# Patient Record
Sex: FEMALE | Race: WHITE | HISPANIC OR LATINO
[De-identification: names, ages, dates, MRNs, and addresses within clinical notes are randomized per-mention and may not be internally consistent; named-entity substitution may affect disease eponyms.]

---

## 2021-06-22 PROBLEM — Z00.00 ENCOUNTER FOR PREVENTIVE HEALTH EXAMINATION: Status: ACTIVE | Noted: 2021-06-22

## 2021-06-29 ENCOUNTER — APPOINTMENT (OUTPATIENT)
Dept: VASCULAR SURGERY | Facility: CLINIC | Age: 46
End: 2021-06-29
Payer: MEDICAID

## 2021-06-29 VITALS
BODY MASS INDEX: 31.82 KG/M2 | HEIGHT: 66 IN | WEIGHT: 198 LBS | DIASTOLIC BLOOD PRESSURE: 93 MMHG | SYSTOLIC BLOOD PRESSURE: 155 MMHG | HEART RATE: 93 BPM

## 2021-06-29 DIAGNOSIS — I87.2 VENOUS INSUFFICIENCY (CHRONIC) (PERIPHERAL): ICD-10-CM

## 2021-06-29 PROCEDURE — 99203 OFFICE O/P NEW LOW 30 MIN: CPT

## 2021-06-29 PROCEDURE — 93970 EXTREMITY STUDY: CPT

## 2021-06-29 NOTE — HISTORY OF PRESENT ILLNESS
[FreeTextEntry1] : 46 yo female with history of b/l lower extremity varicose veins presents for evaluation.  pt states that she has developed pain in the left heal.  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  pt denies any history fo dvt, ulcers or bleeding from the veins.  pt states that she wears compression stockings in the evening.  \par

## 2021-06-29 NOTE — PHYSICAL EXAM
[Varicose Veins Of Lower Extremities] : bilaterally [] : of the left leg [Ankle Swelling On The Left] : moderate [No Rash or Lesion] : No rash or lesion [Alert] : alert [Calm] : calm [JVD] : no jugular venous distention  [Normal Breath Sounds] : Normal breath sounds [Normal Rate and Rhythm] : normal rate and rhythm [2+] : left 2+ [Ankle Swelling (On Exam)] : not present [Skin Ulcer] : no ulcer [de-identified] : appears well [de-identified] : no calf tenderness

## 2021-09-28 ENCOUNTER — APPOINTMENT (OUTPATIENT)
Dept: VASCULAR SURGERY | Facility: CLINIC | Age: 46
End: 2021-09-28

## 2021-10-12 ENCOUNTER — APPOINTMENT (OUTPATIENT)
Dept: VASCULAR SURGERY | Facility: CLINIC | Age: 46
End: 2021-10-12

## 2022-04-15 ENCOUNTER — APPOINTMENT (OUTPATIENT)
Dept: ORTHOPEDIC SURGERY | Facility: CLINIC | Age: 47
End: 2022-04-15
Payer: OTHER MISCELLANEOUS

## 2022-04-15 VITALS — WEIGHT: 195 LBS | HEIGHT: 66 IN | BODY MASS INDEX: 31.34 KG/M2

## 2022-04-15 DIAGNOSIS — Z78.9 OTHER SPECIFIED HEALTH STATUS: ICD-10-CM

## 2022-04-15 PROCEDURE — 99213 OFFICE O/P EST LOW 20 MIN: CPT

## 2022-04-15 PROCEDURE — 99072 ADDL SUPL MATRL&STAF TM PHE: CPT

## 2022-04-15 NOTE — REVIEW OF SYSTEMS
[Joint Pain] : joint pain [Joint Stiffness] : joint stiffness [Joint Swelling] : joint swelling [Headache] : headache

## 2022-04-15 NOTE — HISTORY OF PRESENT ILLNESS
[Sudden] : sudden [7] : 7 [Intermittent] : intermittent [FreeTextEntry1] : 4/15 overall improved left hand [FreeTextEntry3] : 3/19/22 [de-identified] : none

## 2022-05-13 ENCOUNTER — APPOINTMENT (OUTPATIENT)
Dept: ORTHOPEDIC SURGERY | Facility: CLINIC | Age: 47
End: 2022-05-13
Payer: OTHER MISCELLANEOUS

## 2022-05-13 VITALS — WEIGHT: 195 LBS | HEIGHT: 66 IN | BODY MASS INDEX: 31.34 KG/M2

## 2022-05-13 PROCEDURE — 99072 ADDL SUPL MATRL&STAF TM PHE: CPT

## 2022-05-13 PROCEDURE — 99213 OFFICE O/P EST LOW 20 MIN: CPT

## 2022-05-13 NOTE — HISTORY OF PRESENT ILLNESS
[Left Arm] : left arm [Dull/Aching] : dull/aching [Radiating] : radiating [Household chores] : household chores [Leisure] : leisure [Sleep] : sleep [Rest] : rest [Not working due to injury] : Work status: not working due to injury [Sudden] : sudden [7] : 7 [Intermittent] : intermittent [] : Post Surgical Visit: no [FreeTextEntry1] : 4/15 overall improved left hand [FreeTextEntry3] : 3/19/22 [FreeTextEntry5] : Patient was injured at work\par 5/13 slight improvement [FreeTextEntry7] : up the arm at night [de-identified] : activity/night [de-identified] :  [de-identified] : Currently [de-identified] : none

## 2022-05-13 NOTE — REVIEW OF SYSTEMS
[Joint Pain] : joint pain [Joint Swelling] : joint swelling [Joint Stiffness] : joint stiffness [Headache] : headache

## 2022-06-10 ENCOUNTER — APPOINTMENT (OUTPATIENT)
Dept: ORTHOPEDIC SURGERY | Facility: CLINIC | Age: 47
End: 2022-06-10
Payer: OTHER MISCELLANEOUS

## 2022-06-10 VITALS — HEIGHT: 66 IN | WEIGHT: 195 LBS | BODY MASS INDEX: 31.34 KG/M2

## 2022-06-10 PROCEDURE — 99072 ADDL SUPL MATRL&STAF TM PHE: CPT

## 2022-06-10 PROCEDURE — 99213 OFFICE O/P EST LOW 20 MIN: CPT

## 2022-06-10 NOTE — REASON FOR VISIT
[Family Member] : family member [Other: ______] : provided by YUDELKA [FreeTextEntry2] : 6/10/22 Follow up Left Hand  [Interpreters_FullName] : Daughter  [Interpreters_Relationshiptopatient] : Daughter  [FreeTextEntry3] : Trinidadian

## 2022-06-10 NOTE — HISTORY OF PRESENT ILLNESS
[Work related] : work related [Result of repetitive motion] : result of repetitive motion [Left Arm] : left arm [Sudden] : sudden [7] : 7 [Dull/Aching] : dull/aching [Radiating] : radiating [Intermittent] : intermittent [Household chores] : household chores [Leisure] : leisure [Sleep] : sleep [Rest] : rest [Not working due to injury] : Work status: not working due to injury [] : Post Surgical Visit: no [FreeTextEntry1] : 4/15 overall improved left hand [FreeTextEntry3] : 3/19/22 [FreeTextEntry5] : Patient was injured at work\par 5/13 slight improvement\par 6/10 pain improved [de-identified] : activity/night [FreeTextEntry7] : up the arm at night [de-identified] :  [de-identified] : Currently [de-identified] : none

## 2022-07-15 ENCOUNTER — APPOINTMENT (OUTPATIENT)
Dept: ORTHOPEDIC SURGERY | Facility: CLINIC | Age: 47
End: 2022-07-15

## 2022-07-15 VITALS — WEIGHT: 195 LBS | BODY MASS INDEX: 31.34 KG/M2 | HEIGHT: 66 IN

## 2022-07-15 PROCEDURE — 99072 ADDL SUPL MATRL&STAF TM PHE: CPT

## 2022-07-15 PROCEDURE — 99213 OFFICE O/P EST LOW 20 MIN: CPT

## 2022-07-15 NOTE — HISTORY OF PRESENT ILLNESS
[Left Arm] : left arm [Work related] : work related [Sudden] : sudden [Result of repetitive motion] : result of repetitive motion [7] : 7 [Dull/Aching] : dull/aching [Radiating] : radiating [Intermittent] : intermittent [Household chores] : household chores [Leisure] : leisure [Sleep] : sleep [Rest] : rest [Not working due to injury] : Work status: not working due to injury [] : Post Surgical Visit: no [FreeTextEntry1] : 4/15 overall improved left hand [FreeTextEntry3] : 3/19/22 [FreeTextEntry5] : Patient was injured at work\par 5/13 slight improvement\par 6/10 pain improved\par 7/15 feels better [FreeTextEntry7] : up the arm at night [de-identified] : activity/night [de-identified] :  [de-identified] : Currently [de-identified] : none

## 2022-07-15 NOTE — REVIEW OF SYSTEMS
[Joint Pain] : joint pain [Negative] : Heme/Lymph [Joint Stiffness] : joint stiffness [Joint Swelling] : joint swelling [Headache] : headache

## 2022-07-15 NOTE — REASON FOR VISIT
[Family Member] : family member [Other: ______] : provided by YUDELKA [FreeTextEntry2] : 6/10/22 Follow up Left Hand  [Interpreters_FullName] : Daughter  [Interpreters_Relationshiptopatient] : Daughter  [FreeTextEntry3] : Montenegrin

## 2022-07-26 ENCOUNTER — APPOINTMENT (OUTPATIENT)
Dept: CARDIOLOGY | Facility: CLINIC | Age: 47
End: 2022-07-26

## 2022-07-26 ENCOUNTER — NON-APPOINTMENT (OUTPATIENT)
Age: 47
End: 2022-07-26

## 2022-07-26 VITALS
OXYGEN SATURATION: 100 % | DIASTOLIC BLOOD PRESSURE: 94 MMHG | SYSTOLIC BLOOD PRESSURE: 153 MMHG | BODY MASS INDEX: 31.34 KG/M2 | WEIGHT: 195 LBS | TEMPERATURE: 97.5 F | HEART RATE: 72 BPM | HEIGHT: 66 IN | RESPIRATION RATE: 16 BRPM

## 2022-07-26 VITALS — DIASTOLIC BLOOD PRESSURE: 82 MMHG | SYSTOLIC BLOOD PRESSURE: 121 MMHG

## 2022-07-26 DIAGNOSIS — R94.31 ABNORMAL ELECTROCARDIOGRAM [ECG] [EKG]: ICD-10-CM

## 2022-07-26 DIAGNOSIS — Z78.9 OTHER SPECIFIED HEALTH STATUS: ICD-10-CM

## 2022-07-26 DIAGNOSIS — R07.89 OTHER CHEST PAIN: ICD-10-CM

## 2022-07-26 PROCEDURE — 99204 OFFICE O/P NEW MOD 45 MIN: CPT | Mod: 25

## 2022-07-26 PROCEDURE — 93000 ELECTROCARDIOGRAM COMPLETE: CPT

## 2022-07-26 RX ORDER — OMEPRAZOLE 20 MG/1
20 CAPSULE, DELAYED RELEASE ORAL
Qty: 28 | Refills: 0 | Status: ACTIVE | COMMUNITY
Start: 2022-07-01

## 2022-07-26 NOTE — HISTORY OF PRESENT ILLNESS
[FreeTextEntry1] : PCP: Dr. Josie Dhillon\par \par 46F hx of varicose veins who presents for evaluation of chest burning and palpitations\par \par started after colonoscopy, somewhat improved with sucralfate\par has some exercise intolerance/shortness of breath in the morning and at night\par also with dizziness and palpitations, no LOC. experiences near daily\par \par tried omega-3 - with gastritis and had to stop\par \par Fam hx:\par Father  age 67 MI\par Grandfather fatal MI age 80s\par \par Pregnancy hx:\par two children, no  labor, GDM, or PEC\par menopause ~

## 2022-07-26 NOTE — DISCUSSION/SUMMARY
[EKG obtained to assist in diagnosis and management of assessed problem(s)] : EKG obtained to assist in diagnosis and management of assessed problem(s) [FreeTextEntry1] : atypical chest sx, fam hx of CAD\par abnl ECG\par palpitations associated with dizziness\par \par -check TTE to evaluate for structural heart disease\par -stress test to evaluate for cardiac ischemia, exercise tolerance\par -check cardiac event monitor to evaluate for arrhythmia at the time of palpitations

## 2022-07-26 NOTE — REVIEW OF SYSTEMS
[Feeling Fatigued] : feeling fatigued [SOB] : shortness of breath [Chest Discomfort] : chest discomfort [Palpitations] : palpitations [Heartburn] : heartburn [Negative] : Heme/Lymph [Dizziness] : dizziness

## 2022-07-26 NOTE — PHYSICAL EXAM
[Well Developed] : well developed [Well Nourished] : well nourished [No Acute Distress] : no acute distress [Normal Conjunctiva] : normal conjunctiva [Normal Venous Pressure] : normal venous pressure [No Carotid Bruit] : no carotid bruit [Normal S1, S2] : normal S1, S2 [No Murmur] : no murmur [No Rub] : no rub [No Gallop] : no gallop [Clear Lung Fields] : clear lung fields [Good Air Entry] : good air entry [No Respiratory Distress] : no respiratory distress  [Soft] : abdomen soft [Non Tender] : non-tender [Normal Gait] : normal gait [No Edema] : no edema [No Cyanosis] : no cyanosis [No Clubbing] : no clubbing [Venous varicosities] : venous varicosities [No Rash] : no rash [No Skin Lesions] : no skin lesions [Moves all extremities] : moves all extremities [No Focal Deficits] : no focal deficits [Normal Speech] : normal speech [Alert and Oriented] : alert and oriented [Normal memory] : normal memory

## 2022-08-11 ENCOUNTER — NON-APPOINTMENT (OUTPATIENT)
Age: 47
End: 2022-08-11

## 2022-08-23 ENCOUNTER — APPOINTMENT (OUTPATIENT)
Dept: CARDIOLOGY | Facility: CLINIC | Age: 47
End: 2022-08-23

## 2022-09-09 ENCOUNTER — APPOINTMENT (OUTPATIENT)
Dept: ORTHOPEDIC SURGERY | Facility: CLINIC | Age: 47
End: 2022-09-09

## 2022-09-09 VITALS — WEIGHT: 195 LBS | HEIGHT: 66 IN | BODY MASS INDEX: 31.34 KG/M2

## 2022-09-09 PROCEDURE — 99213 OFFICE O/P EST LOW 20 MIN: CPT

## 2022-09-09 PROCEDURE — 99072 ADDL SUPL MATRL&STAF TM PHE: CPT

## 2022-09-09 NOTE — HISTORY OF PRESENT ILLNESS
[Left Arm] : left arm [Work related] : work related [Sudden] : sudden [Result of repetitive motion] : result of repetitive motion [7] : 7 [Dull/Aching] : dull/aching [Radiating] : radiating [Intermittent] : intermittent [Household chores] : household chores [Leisure] : leisure [Sleep] : sleep [Rest] : rest [Not working due to injury] : Work status: not working due to injury [] : Post Surgical Visit: no [FreeTextEntry1] : 4/15 overall improved left hand [FreeTextEntry3] : 3/19/22 [FreeTextEntry5] : Patient was injured at work\par 5/13 slight improvement\par 6/10 pain improved\par 9/9 feels better, continued tingling\par 7/15 feels better [FreeTextEntry7] : up the arm at night [de-identified] : activity/night [de-identified] :  [de-identified] : Currently [de-identified] : none

## 2022-09-09 NOTE — REASON FOR VISIT
[Family Member] : family member [Other: ______] : provided by YUDELKA [FreeTextEntry2] : 6/10/22 Follow up Left Hand  [Interpreters_FullName] : Daughter  [Interpreters_Relationshiptopatient] : Daughter  [FreeTextEntry3] : Bahamian

## 2022-09-12 ENCOUNTER — APPOINTMENT (OUTPATIENT)
Dept: CARDIOLOGY | Facility: CLINIC | Age: 47
End: 2022-09-12

## 2022-09-12 PROCEDURE — ZZZZZ: CPT

## 2022-09-12 PROCEDURE — 99213 OFFICE O/P EST LOW 20 MIN: CPT | Mod: 25

## 2022-09-12 PROCEDURE — 93015 CV STRESS TEST SUPVJ I&R: CPT

## 2022-10-17 ENCOUNTER — APPOINTMENT (OUTPATIENT)
Dept: ORTHOPEDIC SURGERY | Facility: CLINIC | Age: 47
End: 2022-10-17

## 2022-10-17 VITALS — HEIGHT: 66 IN | BODY MASS INDEX: 31.34 KG/M2 | WEIGHT: 195 LBS

## 2022-10-17 PROCEDURE — 99072 ADDL SUPL MATRL&STAF TM PHE: CPT

## 2022-10-17 PROCEDURE — 99213 OFFICE O/P EST LOW 20 MIN: CPT

## 2022-10-17 NOTE — HISTORY OF PRESENT ILLNESS
[Work related] : work related [Sudden] : sudden [Shooting] : shooting [Intermittent] : intermittent [Not working due to injury] : Work status: not working due to injury [Left Arm] : left arm [Result of repetitive motion] : result of repetitive motion [7] : 7 [Dull/Aching] : dull/aching [Radiating] : radiating [Household chores] : household chores [Leisure] : leisure [Sleep] : sleep [Rest] : rest [] : Post Surgical Visit: no [FreeTextEntry1] : 4/15 overall improved left hand [FreeTextEntry3] : 3/19/22 [FreeTextEntry5] : Patient was injured at work\par 5/13 slight improvement\par 6/10 pain improved\par 10/17 feels better\par 9/9 feels better, continued tingling\par 7/15 feels better [FreeTextEntry6] : pulling, numbness [FreeTextEntry7] : up the arm at night [FreeTextEntry9] : tylenol [de-identified] : activity/night [de-identified] : 9/9/22 [de-identified] :  [de-identified] : Currently [de-identified] : xrays [de-identified] : none

## 2022-10-17 NOTE — REASON FOR VISIT
[Family Member] : family member [Other: ______] : provided by YUDELKA [FreeTextEntry2] : 6/10/22 Follow up Left Hand  [Interpreters_FullName] : Daughter  [Interpreters_Relationshiptopatient] : Daughter  [FreeTextEntry3] : Pakistani

## 2022-11-14 ENCOUNTER — APPOINTMENT (OUTPATIENT)
Dept: ORTHOPEDIC SURGERY | Facility: CLINIC | Age: 47
End: 2022-11-14

## 2022-11-14 VITALS — WEIGHT: 195 LBS | BODY MASS INDEX: 31.34 KG/M2 | HEIGHT: 66 IN

## 2022-11-14 PROCEDURE — 99213 OFFICE O/P EST LOW 20 MIN: CPT

## 2022-11-14 PROCEDURE — 99072 ADDL SUPL MATRL&STAF TM PHE: CPT

## 2022-11-14 NOTE — REASON FOR VISIT
[Family Member] : family member [Other: ______] : provided by YUDELKA [FreeTextEntry2] : 6/10/22 Follow up Left Hand  [Interpreters_FullName] : Daughter  [Interpreters_Relationshiptopatient] : Daughter  [FreeTextEntry3] : Italian

## 2022-11-14 NOTE — HISTORY OF PRESENT ILLNESS
[Left Arm] : left arm [Work related] : work related [Sudden] : sudden [Result of repetitive motion] : result of repetitive motion [7] : 7 [Dull/Aching] : dull/aching [Radiating] : radiating [Shooting] : shooting [Intermittent] : intermittent [Household chores] : household chores [Leisure] : leisure [Sleep] : sleep [Rest] : rest [Not working due to injury] : Work status: not working due to injury [] : Post Surgical Visit: no [FreeTextEntry1] : 4/15 overall improved left hand [FreeTextEntry3] : 3/19/22 [FreeTextEntry5] : Patient was injured at work\par 5/13 slight improvement\par 6/10 pain improved\par 10/17 feels better\par 9/9 feels better, continued tingling\par 7/15 feels better\par 11/14 unchanged tingling, at light duty [FreeTextEntry6] : pulling, numbness [FreeTextEntry7] : up the arm at night [FreeTextEntry9] : tylenol [de-identified] : activity/night [de-identified] : 9/9/22 [de-identified] :  [de-identified] : Currently [de-identified] : xrays [de-identified] : none

## 2022-12-30 ENCOUNTER — APPOINTMENT (OUTPATIENT)
Dept: ORTHOPEDIC SURGERY | Facility: CLINIC | Age: 47
End: 2022-12-30
Payer: OTHER MISCELLANEOUS

## 2022-12-30 ENCOUNTER — NON-APPOINTMENT (OUTPATIENT)
Age: 47
End: 2022-12-30

## 2022-12-30 VITALS — BODY MASS INDEX: 31.34 KG/M2 | WEIGHT: 195 LBS | HEIGHT: 66 IN

## 2022-12-30 PROCEDURE — 99213 OFFICE O/P EST LOW 20 MIN: CPT

## 2022-12-30 PROCEDURE — 99072 ADDL SUPL MATRL&STAF TM PHE: CPT

## 2022-12-30 NOTE — REASON FOR VISIT
[Family Member] : family member [Other: ______] : provided by YUDELKA [FreeTextEntry2] : 6/10/22 Follow up Left Hand  [Interpreters_FullName] : Daughter  [Interpreters_Relationshiptopatient] : Daughter  [FreeTextEntry3] : Bel[place]ian

## 2022-12-30 NOTE — REVIEW OF SYSTEMS
[Joint Stiffness] : joint stiffness [Joint Pain] : joint pain [Joint Swelling] : joint swelling [Headache] : headache [Negative] : Heme/Lymph

## 2022-12-30 NOTE — HISTORY OF PRESENT ILLNESS
[Left Arm] : left arm [Work related] : work related [Sudden] : sudden [Result of repetitive motion] : result of repetitive motion [7] : 7 [Dull/Aching] : dull/aching [Radiating] : radiating [Shooting] : shooting [Intermittent] : intermittent [Household chores] : household chores [Leisure] : leisure [Sleep] : sleep [Rest] : rest [Not working due to injury] : Work status: not working due to injury [] : Post Surgical Visit: no [FreeTextEntry1] : 4/15 overall improved left hand [FreeTextEntry3] : 3/19/22 [FreeTextEntry5] : Patient was injured at work\par 5/13 slight improvement\par 6/10 pain improved\par 10/17 feels better\par 9/9 feels better, continued tingling\par 7/15 feels better\par 11/14 unchanged tingling, at light duty\par 12/30 feels stiff [FreeTextEntry6] : pulling, numbness [FreeTextEntry7] : up the arm at night [FreeTextEntry9] : tylenol [de-identified] : activity/night [de-identified] : 9/9/22 [de-identified] :  [de-identified] : Currently [de-identified] : xrays [de-identified] : none

## 2023-01-10 ENCOUNTER — FORM ENCOUNTER (OUTPATIENT)
Age: 48
End: 2023-01-10

## 2023-01-11 ENCOUNTER — APPOINTMENT (OUTPATIENT)
Dept: MRI IMAGING | Facility: CLINIC | Age: 48
End: 2023-01-11
Payer: OTHER MISCELLANEOUS

## 2023-01-11 PROCEDURE — 73218 MRI UPPER EXTREMITY W/O DYE: CPT | Mod: LT

## 2023-01-11 PROCEDURE — 99072 ADDL SUPL MATRL&STAF TM PHE: CPT

## 2023-01-19 ENCOUNTER — FORM ENCOUNTER (OUTPATIENT)
Age: 48
End: 2023-01-19

## 2023-01-27 ENCOUNTER — APPOINTMENT (OUTPATIENT)
Dept: CARDIOLOGY | Facility: CLINIC | Age: 48
End: 2023-01-27
Payer: MEDICAID

## 2023-01-27 ENCOUNTER — NON-APPOINTMENT (OUTPATIENT)
Age: 48
End: 2023-01-27

## 2023-01-27 VITALS
SYSTOLIC BLOOD PRESSURE: 154 MMHG | WEIGHT: 198 LBS | HEART RATE: 74 BPM | OXYGEN SATURATION: 100 % | DIASTOLIC BLOOD PRESSURE: 77 MMHG | HEIGHT: 66 IN | BODY MASS INDEX: 31.82 KG/M2 | TEMPERATURE: 98.4 F

## 2023-01-27 VITALS — SYSTOLIC BLOOD PRESSURE: 138 MMHG | DIASTOLIC BLOOD PRESSURE: 84 MMHG

## 2023-01-27 PROCEDURE — 99214 OFFICE O/P EST MOD 30 MIN: CPT | Mod: 25

## 2023-01-27 PROCEDURE — 93000 ELECTROCARDIOGRAM COMPLETE: CPT

## 2023-01-27 RX ORDER — METOPROLOL TARTRATE 25 MG/1
25 TABLET, FILM COATED ORAL TWICE DAILY
Qty: 180 | Refills: 2 | Status: DISCONTINUED | COMMUNITY
Start: 2022-08-11 | End: 2023-01-27

## 2023-01-27 NOTE — CARDIOLOGY SUMMARY
[de-identified] : 1/27/23: SR, poor R wave progression, nonspecific t wave abn\par 7/26/22: SR, anterior infarct pattern [de-identified] : 7/26/22-7/29/22 - SVT, NSVT x 6 beats [de-identified] : 9/12/22:\par Jose protocol 7:11. No ischemia [de-identified] : 9/7/22: \par EF 60%, no significant valve disease

## 2023-01-27 NOTE — HISTORY OF PRESENT ILLNESS
[FreeTextEntry1] : PCP: Dr. Josie Dhillon\par \par 47F SVT, hx of varicose veins who presents for evaluation of elevated BP\par \par Recently had BP checked and noted to have SBP 170s a/w headache and dizziness\par \par has hx of dizziness and palpitations, cardiac event monitor with SVT and an episode of NSVT\par \par No chest pain, LE edema, or orthopnea.\par \par Fam hx:\par Father  age 67 MI\par Grandfather fatal MI age 80s\par \par Pregnancy hx:\par two children, no  labor, GDM, or PEC\par menopause ~

## 2023-01-27 NOTE — REVIEW OF SYSTEMS
[Feeling Fatigued] : feeling fatigued [SOB] : shortness of breath [Palpitations] : palpitations [Dizziness] : dizziness [Negative] : Heme/Lymph

## 2023-01-27 NOTE — DISCUSSION/SUMMARY
[EKG obtained to assist in diagnosis and management of assessed problem(s)] : EKG obtained to assist in diagnosis and management of assessed problem(s) [FreeTextEntry1] : palpitations associated with dizziness\par HTN\par hx of SVT\par \par -cont metoprolol - will change to metoprolol succinate 50mg once daily\par -add diltiazem 120mg once daily for additional antihypertensive effect and to aid with SVT\par -ambulatory BP monitoring for further medication titration\par \par If sx continue, will check repeat cardiac event monitor to determine ectopic burden

## 2023-01-30 ENCOUNTER — NON-APPOINTMENT (OUTPATIENT)
Age: 48
End: 2023-01-30

## 2023-01-30 ENCOUNTER — APPOINTMENT (OUTPATIENT)
Dept: ORTHOPEDIC SURGERY | Facility: CLINIC | Age: 48
End: 2023-01-30
Payer: OTHER MISCELLANEOUS

## 2023-01-30 VITALS — HEIGHT: 66 IN | BODY MASS INDEX: 31.82 KG/M2 | WEIGHT: 198 LBS

## 2023-01-30 PROCEDURE — 99213 OFFICE O/P EST LOW 20 MIN: CPT

## 2023-01-30 PROCEDURE — 99072 ADDL SUPL MATRL&STAF TM PHE: CPT

## 2023-01-30 NOTE — REASON FOR VISIT
[Family Member] : family member [Other: ______] : provided by YUDELKA [FreeTextEntry2] : 6/10/22 Follow up Left Hand  [Interpreters_FullName] : Daughter  [Interpreters_Relationshiptopatient] : Daughter  [FreeTextEntry3] : Zambian

## 2023-01-30 NOTE — HISTORY OF PRESENT ILLNESS
[7] : 7 [Left Arm] : left arm [Work related] : work related [Sudden] : sudden [Result of repetitive motion] : result of repetitive motion [Dull/Aching] : dull/aching [Radiating] : radiating [Shooting] : shooting [Intermittent] : intermittent [Household chores] : household chores [Leisure] : leisure [Sleep] : sleep [Rest] : rest [Not working due to injury] : Work status: not working due to injury [] : Post Surgical Visit: no [FreeTextEntry1] : 4/15 overall improved left hand [FreeTextEntry3] : 3/19/22 [FreeTextEntry5] : Patient was injured at work\par 5/13 slight improvement\par 6/10 pain improved\par 10/17 feels better\par 9/9 feels better, continued tingling\par 7/15 feels better\par 11/14 unchanged tingling, at light duty\par 12/30 feels stiff\par 1/30 mri: possible chronic partial tear 4 extensor. [FreeTextEntry6] : pulling, numbness [FreeTextEntry7] : up the arm at night [FreeTextEntry9] : tylenol [de-identified] : activity/night [de-identified] : 9/9/22 [de-identified] :  [de-identified] : Currently [de-identified] : xrays [de-identified] : none

## 2023-03-03 ENCOUNTER — NON-APPOINTMENT (OUTPATIENT)
Age: 48
End: 2023-03-03

## 2023-03-03 ENCOUNTER — APPOINTMENT (OUTPATIENT)
Dept: ORTHOPEDIC SURGERY | Facility: CLINIC | Age: 48
End: 2023-03-03
Payer: OTHER MISCELLANEOUS

## 2023-03-03 VITALS — HEIGHT: 66 IN | BODY MASS INDEX: 31.82 KG/M2 | WEIGHT: 198 LBS

## 2023-03-03 PROCEDURE — 99072 ADDL SUPL MATRL&STAF TM PHE: CPT

## 2023-03-03 PROCEDURE — 99213 OFFICE O/P EST LOW 20 MIN: CPT

## 2023-03-03 NOTE — HISTORY OF PRESENT ILLNESS
[Left Arm] : left arm [Work related] : work related [Sudden] : sudden [Result of repetitive motion] : result of repetitive motion [7] : 7 [Dull/Aching] : dull/aching [Radiating] : radiating [Shooting] : shooting [Intermittent] : intermittent [Household chores] : household chores [Leisure] : leisure [Sleep] : sleep [Rest] : rest [Not working due to injury] : Work status: not working due to injury [] : Post Surgical Visit: no [FreeTextEntry1] : 4/15 overall improved left hand [FreeTextEntry3] : 3/19/22 [FreeTextEntry5] : Patient was injured at work\par 5/13 slight improvement\par 6/10 pain improved\par 10/17 feels better\par 9/9 feels better, continued tingling\par 7/15 feels better\par 11/14 unchanged tingling, at light duty\par 12/30 feels stiff\par 1/30 mri: possible chronic partial tear 4 extensor.\par 3/3 feels better [FreeTextEntry6] : pulling, numbness [FreeTextEntry7] : up the arm at night [FreeTextEntry9] : tylenol [de-identified] : activity/night [de-identified] : 9/9/22 [de-identified] :  [de-identified] : Currently [de-identified] : xrays [de-identified] : none

## 2023-03-03 NOTE — REASON FOR VISIT
[Family Member] : family member [Other: ______] : provided by YUDELKA [FreeTextEntry2] : 6/10/22 Follow up Left Hand \par  [Interpreters_FullName] : Daughter  [Interpreters_Relationshiptopatient] : Daughter  [FreeTextEntry3] : [place]

## 2023-03-06 ENCOUNTER — APPOINTMENT (OUTPATIENT)
Dept: CARDIOLOGY | Facility: CLINIC | Age: 48
End: 2023-03-06

## 2023-04-10 ENCOUNTER — APPOINTMENT (OUTPATIENT)
Dept: CARDIOLOGY | Facility: CLINIC | Age: 48
End: 2023-04-10
Payer: MEDICAID

## 2023-04-10 ENCOUNTER — APPOINTMENT (OUTPATIENT)
Dept: ORTHOPEDIC SURGERY | Facility: CLINIC | Age: 48
End: 2023-04-10
Payer: OTHER MISCELLANEOUS

## 2023-04-10 ENCOUNTER — NON-APPOINTMENT (OUTPATIENT)
Age: 48
End: 2023-04-10

## 2023-04-10 VITALS
TEMPERATURE: 98.1 F | OXYGEN SATURATION: 98 % | BODY MASS INDEX: 31.82 KG/M2 | SYSTOLIC BLOOD PRESSURE: 135 MMHG | WEIGHT: 198 LBS | HEART RATE: 86 BPM | HEIGHT: 66 IN | DIASTOLIC BLOOD PRESSURE: 87 MMHG

## 2023-04-10 VITALS — BODY MASS INDEX: 31.82 KG/M2 | WEIGHT: 198 LBS | HEIGHT: 66 IN

## 2023-04-10 PROCEDURE — 99214 OFFICE O/P EST MOD 30 MIN: CPT

## 2023-04-10 PROCEDURE — 99213 OFFICE O/P EST LOW 20 MIN: CPT

## 2023-04-10 NOTE — REVIEW OF SYSTEMS
[Arthralgia] : arthralgia [Joint Pain] : joint pain [Joint Stiffness] : joint stiffness [Joint Swelling] : joint swelling [Negative] : Heme/Lymph [Headache] : headache

## 2023-04-10 NOTE — HISTORY OF PRESENT ILLNESS
[Localized] : localized [Radiating] : radiating [Sharp] : sharp [Shooting] : shooting [Tingling] : tingling [Nothing helps with pain getting better] : Nothing helps with pain getting better [Exercising] : exercising [Left Arm] : left arm [Work related] : work related [Sudden] : sudden [Result of repetitive motion] : result of repetitive motion [7] : 7 [Dull/Aching] : dull/aching [Intermittent] : intermittent [Household chores] : household chores [Leisure] : leisure [Sleep] : sleep [Rest] : rest [Not working due to injury] : Work status: not working due to injury [] : Post Surgical Visit: no [FreeTextEntry1] : 4/15 overall improved left hand [FreeTextEntry3] : 3/19/22 [FreeTextEntry5] : Patient was injured at work\par 5/13 slight improvement\par 6/10 pain improved\par 10/17 feels better\par 9/9 feels better, continued tingling\par 7/15 feels better\par 11/14 unchanged tingling, at light duty\par 12/30 feels stiff\par 1/30 mri: possible chronic partial tear 4 extensor.\par 3/3 feels better\par 4/10 helped by therapy [FreeTextEntry6] : pulling, numbness [FreeTextEntry7] : up the arm at night [FreeTextEntry9] : tylenol [de-identified] : activity/night [de-identified] : 9/9/22 [de-identified] :  [de-identified] : Currently [de-identified] : xrays [de-identified] : none

## 2023-04-10 NOTE — REASON FOR VISIT
[Family Member] : family member [Other: ______] : provided by YUDELKA [FreeTextEntry2] : 6/10/22 Follow up Left Hand \par  [Interpreters_FullName] : Daughter  [Interpreters_Relationshiptopatient] : Daughter  [FreeTextEntry3] : Kenyan

## 2023-04-11 NOTE — CARDIOLOGY SUMMARY
[de-identified] : 1/27/23: SR, poor R wave progression, nonspecific t wave abn\par 7/26/22: SR, anterior infarct pattern [de-identified] : 7/26/22-7/29/22 - SVT, NSVT x 6 beats [de-identified] : 9/12/22:\par Jose protocol 7:11. No ischemia [de-identified] : 9/7/22: \par EF 60%, no significant valve disease

## 2023-04-11 NOTE — HISTORY OF PRESENT ILLNESS
[FreeTextEntry1] : PCP: Dr. Josie Dhillon\par \par 47F SVT, hx of varicose veins who presents for follow-up\par \par has hx of dizziness and palpitations, cardiac event monitor with SVT and an episode of NSVT\par No chest pain, LE edema, or orthopnea\par since initiation of diltiazem and metoprolol she has been feeling very well\par BP's better controlled\par \par Fam hx:\par Father  age 67 MI\par Grandfather fatal MI age 80s\par \par Pregnancy hx:\par two children, no  labor, GDM, or PEC\par menopause ~

## 2023-04-11 NOTE — DISCUSSION/SUMMARY
Patient slept 9.30 hours. Still asleep. No distress noted. No issues during the night. [FreeTextEntry1] : palpitations associated with dizziness\par HTN\par hx of SVT\par \par -cont metoprolol succinate 50mg once daily\par -cont diltiazem 120mg once daily for additional antihypertensive effect and to aid with SVT\par -ambulatory BP monitoring for further medication titration - has been working well\par

## 2023-05-16 ENCOUNTER — APPOINTMENT (OUTPATIENT)
Dept: ORTHOPEDIC SURGERY | Facility: CLINIC | Age: 48
End: 2023-05-16
Payer: OTHER MISCELLANEOUS

## 2023-05-16 ENCOUNTER — NON-APPOINTMENT (OUTPATIENT)
Age: 48
End: 2023-05-16

## 2023-05-16 VITALS — HEIGHT: 66 IN | WEIGHT: 198 LBS | BODY MASS INDEX: 31.82 KG/M2

## 2023-05-16 PROCEDURE — 99213 OFFICE O/P EST LOW 20 MIN: CPT

## 2023-05-16 NOTE — HISTORY OF PRESENT ILLNESS
[Left Arm] : left arm [Work related] : work related [Sudden] : sudden [Result of repetitive motion] : result of repetitive motion [7] : 7 [Dull/Aching] : dull/aching [Localized] : localized [Radiating] : radiating [Sharp] : sharp [Shooting] : shooting [Tingling] : tingling [Intermittent] : intermittent [Household chores] : household chores [Leisure] : leisure [Sleep] : sleep [Rest] : rest [Nothing helps with pain getting better] : Nothing helps with pain getting better [Exercising] : exercising [Not working due to injury] : Work status: not working due to injury [] : Post Surgical Visit: no [FreeTextEntry1] : 4/15 overall improved left hand [FreeTextEntry3] : 3/19/22 [FreeTextEntry5] : Patient was injured at work\par 5/13 slight improvement\par 6/10 pain improved\par 10/17 feels better\par 9/9 feels better, continued tingling\par 7/15 feels better\par 11/14 unchanged tingling, at light duty\par 12/30 feels stiff\par 1/30 mri: possible chronic partial tear 4 extensor.\par 3/3 feels better\par 4/10 helped by therapy\par 5/16 continued pain and tingling [FreeTextEntry6] : pulling, numbness [FreeTextEntry7] : up the arm at night [FreeTextEntry9] : tylenol [de-identified] : activity/night [de-identified] : 9/9/22 [de-identified] :  [de-identified] : Currently [de-identified] : xrays [de-identified] : none

## 2023-05-16 NOTE — REVIEW OF SYSTEMS
[Joint Pain] : joint pain [Negative] : Heme/Lymph [Arthralgia] : arthralgia [Joint Stiffness] : joint stiffness [Joint Swelling] : joint swelling [Headache] : headache

## 2023-05-16 NOTE — REASON FOR VISIT
[Family Member] : family member [Other: ______] : provided by YUDELKA [FreeTextEntry2] : 6/10/22 Follow up Left Hand \par  [Interpreters_FullName] : Daughter  [Interpreters_Relationshiptopatient] : Daughter  [FreeTextEntry3] : Bangladeshi

## 2023-06-05 ENCOUNTER — APPOINTMENT (OUTPATIENT)
Dept: ORTHOPEDIC SURGERY | Facility: CLINIC | Age: 48
End: 2023-06-05
Payer: OTHER MISCELLANEOUS

## 2023-06-05 PROCEDURE — 99214 OFFICE O/P EST MOD 30 MIN: CPT

## 2023-06-05 NOTE — ASSESSMENT
[FreeTextEntry1] : The condition was explained to the patient.\par - awaiting  authorization for OT. continue HEP in the interim.\par - activity modification PRN.\par - Work: she is currently working light duty.\par \par She will continue f/u with Dr. Merrill.

## 2023-06-05 NOTE — IMAGING
[de-identified] : LEFT HAND\par curvilinear scar over dorsal ulnar hand with mild surrounding swelling. \par skin intact.\par no TTP.\par good EPL, FPL. good finger extension, flex 3cm to DPC actively, flex to full fist passively.\par numbness of dorsal hand around scar. numbness of MF/RF/SF dorsally and volarly. SILT to other digits.\par brisk cap refill all digits.\par no triggering.\par \par \par XRAYS OF LEFT HAND @4/1/22: no acute displaced fracture or dislocation.

## 2023-06-05 NOTE — HISTORY OF PRESENT ILLNESS
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 Merrill. Reports that her LEFT hand was caught in a pasta machine. Has been attending OT, with improvement in stiffness, stopped 1 month ago - awaiting  authorization to continue OT. c/o swelling of LEFT dorsal hand. c/o numbness of LEFT dorsal hand and middle/ring/small finger. c/o intermittent pain/cramping of those fingers.\par Presents here for second opinion.\par Using Ibuprofen, Tylenol PRN.\par \par MRI L hand 1/11/23 - IMPRESSION:\par 1. Irregular appearance with flattened attenuation of the fourth extensor tendon in addition to soft tissue scarring dorsal to the fourth metacarpal without evidence of acute fracture, malalignment, ligament tear or flexor tendon tear. The possibility of chronic partial tearing of the fourth extensor tendon dorsal to the fourth metacarpal should be considered and clinical correlation regarding any fourth extensor tendon dysfunction is recommended.\par 2. Soft tissue scarring in between the heads of the third and fourth metacarpals along the dorsal aspect likely related to remote trauma over an area measuring approximately 2 cm.\par \par Denies prior injury/issue to LEFT hand.\par She is currently working light duty.\par \par Hx: SVTs. HTN. [] : no [FreeTextEntry1] : LEFT hand  [FreeTextEntry3] : 03/19/22 [de-identified] : 05/16/23 [FreeTextEntry5] : RAJAN WINTERS panchito [LHD] 47 year old female is here today complaining of LEFT hand pain. onset of pain 03/19/22 after her hand got caught in a pasta making machine. pt went to ED via EMS. pt attending therapy (waiting for renewal from ). c/o stiffness and pain constantly, dependent on medication. \par -pt went back to work with restrictions 7 months after incident  [de-identified] : [place]

## 2023-07-07 ENCOUNTER — APPOINTMENT (OUTPATIENT)
Dept: ORTHOPEDIC SURGERY | Facility: CLINIC | Age: 48
End: 2023-07-07
Payer: OTHER MISCELLANEOUS

## 2023-07-07 ENCOUNTER — NON-APPOINTMENT (OUTPATIENT)
Age: 48
End: 2023-07-07

## 2023-07-07 VITALS — WEIGHT: 198 LBS | HEIGHT: 66 IN | BODY MASS INDEX: 31.82 KG/M2

## 2023-07-07 PROCEDURE — 99213 OFFICE O/P EST LOW 20 MIN: CPT

## 2023-07-07 NOTE — REVIEW OF SYSTEMS
[Joint Pain] : joint pain [Joint Stiffness] : joint stiffness [Joint Swelling] : joint swelling [Arthralgia] : arthralgia [Headache] : headache [Negative] : Heme/Lymph

## 2023-07-07 NOTE — REASON FOR VISIT
[Family Member] : family member [Other: ______] : provided by YUDELKA [FreeTextEntry2] : 6/10/22 Follow up Left Hand \par  [Interpreters_FullName] : Daughter  [Interpreters_Relationshiptopatient] : Daughter  [FreeTextEntry3] : [place] Ze[place]

## 2023-07-07 NOTE — HISTORY OF PRESENT ILLNESS
[Left Arm] : left arm [Work related] : work related [Sudden] : sudden [Result of repetitive motion] : result of repetitive motion [7] : 7 [Dull/Aching] : dull/aching [Localized] : localized [Radiating] : radiating [Sharp] : sharp [Shooting] : shooting [Tingling] : tingling [Intermittent] : intermittent [Household chores] : household chores [Leisure] : leisure [Sleep] : sleep [Rest] : rest [Nothing helps with pain getting better] : Nothing helps with pain getting better [Exercising] : exercising [Not working due to injury] : Work status: not working due to injury [] : Post Surgical Visit: no [FreeTextEntry1] : 4/15 overall improved left hand [FreeTextEntry3] : 3/19/22 [FreeTextEntry5] : Patient was injured at work\par 5/13 slight improvement\par 6/10 pain improved\par 10/17 feels better\par 9/9 feels better, continued tingling\par 7/15 feels better\par 11/14 unchanged tingling, at light duty\par 12/30 feels stiff\par 1/30 mri: possible chronic partial tear 4 extensor.\par 3/3 feels better\par 4/10 helped by therapy\par 5/16 continued pain and tingling\par 7/7 unchanged pain and tingling [FreeTextEntry6] : pulling, numbness [FreeTextEntry7] : up the arm at night [FreeTextEntry9] : tylenol [de-identified] : activity/night [de-identified] : 9/9/22 [de-identified] :  [de-identified] : Currently [de-identified] : xrays [de-identified] : none

## 2023-08-14 ENCOUNTER — NON-APPOINTMENT (OUTPATIENT)
Age: 48
End: 2023-08-14

## 2023-08-14 ENCOUNTER — APPOINTMENT (OUTPATIENT)
Dept: ORTHOPEDIC SURGERY | Facility: CLINIC | Age: 48
End: 2023-08-14
Payer: OTHER MISCELLANEOUS

## 2023-08-14 VITALS — WEIGHT: 198 LBS | BODY MASS INDEX: 31.82 KG/M2 | HEIGHT: 66 IN

## 2023-08-14 PROCEDURE — 99213 OFFICE O/P EST LOW 20 MIN: CPT

## 2023-08-14 NOTE — REASON FOR VISIT
[Family Member] : family member [Other: ______] : provided by YUDELKA [FreeTextEntry2] : 6/10/22 Follow up Left Hand \par   [Interpreters_FullName] : Daughter  [Interpreters_Relationshiptopatient] : Daughter  [FreeTextEntry3] : Martiniquais

## 2023-08-14 NOTE — HISTORY OF PRESENT ILLNESS
[Left Arm] : left arm [Work related] : work related [Sudden] : sudden [Result of repetitive motion] : result of repetitive motion [7] : 7 [Dull/Aching] : dull/aching [Localized] : localized [Radiating] : radiating [Sharp] : sharp [Shooting] : shooting [Tingling] : tingling [Intermittent] : intermittent [Household chores] : household chores [Leisure] : leisure [Sleep] : sleep [Rest] : rest [Nothing helps with pain getting better] : Nothing helps with pain getting better [Exercising] : exercising [Not working due to injury] : Work status: not working due to injury [] : Post Surgical Visit: no [FreeTextEntry1] : 4/15 overall improved left hand [FreeTextEntry3] : 3/19/22 [FreeTextEntry5] : Patient was injured at work 5/13 slight improvement 6/10 pain improved 10/17 feels better 9/9 feels better, continued tingling 7/15 feels better 11/14 unchanged tingling, at light duty 12/30 feels stiff 1/30 mri: possible chronic partial tear 4 extensor. 3/3 feels better 4/10 helped by therapy 5/16 continued pain and tingling 7/7 unchanged pain and tingling 8/14 continued pain [FreeTextEntry6] : pulling, numbness [FreeTextEntry7] : up the arm at night [FreeTextEntry9] : tylenol [de-identified] : activity/night [de-identified] : 9/9/22 [de-identified] :  [de-identified] : Currently [de-identified] : xrays [de-identified] : none

## 2023-09-18 ENCOUNTER — APPOINTMENT (OUTPATIENT)
Dept: ORTHOPEDIC SURGERY | Facility: CLINIC | Age: 48
End: 2023-09-18

## 2023-09-22 ENCOUNTER — APPOINTMENT (OUTPATIENT)
Dept: ORTHOPEDIC SURGERY | Facility: CLINIC | Age: 48
End: 2023-09-22
Payer: OTHER MISCELLANEOUS

## 2023-09-22 ENCOUNTER — NON-APPOINTMENT (OUTPATIENT)
Age: 48
End: 2023-09-22

## 2023-09-22 VITALS — BODY MASS INDEX: 31.82 KG/M2 | HEIGHT: 66 IN | WEIGHT: 198 LBS

## 2023-09-22 PROCEDURE — 99213 OFFICE O/P EST LOW 20 MIN: CPT

## 2023-11-07 ENCOUNTER — APPOINTMENT (OUTPATIENT)
Dept: ORTHOPEDIC SURGERY | Facility: CLINIC | Age: 48
End: 2023-11-07
Payer: OTHER MISCELLANEOUS

## 2023-11-07 VITALS — HEIGHT: 66 IN | WEIGHT: 198 LBS | BODY MASS INDEX: 31.82 KG/M2

## 2023-11-07 PROCEDURE — 99215 OFFICE O/P EST HI 40 MIN: CPT

## 2023-11-15 ENCOUNTER — APPOINTMENT (OUTPATIENT)
Dept: CARDIOLOGY | Facility: CLINIC | Age: 48
End: 2023-11-15
Payer: MEDICAID

## 2023-11-15 VITALS
OXYGEN SATURATION: 100 % | SYSTOLIC BLOOD PRESSURE: 157 MMHG | HEART RATE: 64 BPM | DIASTOLIC BLOOD PRESSURE: 84 MMHG | HEIGHT: 66 IN | BODY MASS INDEX: 32.62 KG/M2 | TEMPERATURE: 98.2 F | WEIGHT: 203 LBS

## 2023-11-15 PROCEDURE — 99214 OFFICE O/P EST MOD 30 MIN: CPT

## 2023-11-15 RX ORDER — FAMOTIDINE 10 MG/1
TABLET, FILM COATED ORAL
Refills: 0 | Status: ACTIVE | COMMUNITY

## 2024-01-15 ENCOUNTER — NON-APPOINTMENT (OUTPATIENT)
Age: 49
End: 2024-01-15

## 2024-01-15 ENCOUNTER — APPOINTMENT (OUTPATIENT)
Dept: ORTHOPEDIC SURGERY | Facility: CLINIC | Age: 49
End: 2024-01-15
Payer: OTHER MISCELLANEOUS

## 2024-01-15 VITALS — WEIGHT: 203 LBS | BODY MASS INDEX: 32.62 KG/M2 | HEIGHT: 66 IN

## 2024-01-15 PROCEDURE — 99213 OFFICE O/P EST LOW 20 MIN: CPT

## 2024-01-15 NOTE — PHYSICAL EXAM
[] : intact incisions with sutures in place [2nd] : 2nd [3rd] : 3rd [4th] : 4th [MCP Joint] : MCP joint

## 2024-01-15 NOTE — HISTORY OF PRESENT ILLNESS
[Sudden] : sudden [Work related] : work related [Sharp] : sharp [Shooting] : shooting [Stabbing] : stabbing [Meds] : meds [Result of repetitive motion] : result of repetitive motion [Left Arm] : left arm [7] : 7 [Dull/Aching] : dull/aching [Localized] : localized [Radiating] : radiating [Tingling] : tingling [Intermittent] : intermittent [Household chores] : household chores [Leisure] : leisure [Sleep] : sleep [Rest] : rest [Nothing helps with pain getting better] : Nothing helps with pain getting better [Exercising] : exercising [Not working due to injury] : Work status: not working due to injury [] : Post Surgical Visit: no [FreeTextEntry1] : 4/15 overall improved left hand [FreeTextEntry3] : 3/19/22 [FreeTextEntry5] : Patient was injured at work 5/13 slight improvement 6/10 pain improved 10/17 feels better 9/9 feels better, continued tingling 7/15 feels better 11/14 unchanged tingling, at light duty 12/30 feels stiff 1/30 mri: possible chronic partial tear 4 extensor. 3/3 feels better 4/10 helped by therapy 5/16 continued pain and tingling 7/7 unchanged pain and tingling 8/14 continued pain 9/22 working at light duty 1/15 working at light duty [FreeTextEntry6] : pulling, numbness [FreeTextEntry7] : up the arm at night [FreeTextEntry9] : tylenol [de-identified] : activity/night [de-identified] : 9/9/22 [de-identified] : Currently [de-identified] :  [de-identified] : xrays [de-identified] : none

## 2024-01-15 NOTE — WORK
[Has the patient reached Maximum Medical Improvement? If yes, indicate date___] : Yes, on [unfilled] [Is there permanent partial impairment?] : Yes [FreeTextEntry6] : 10% l hand

## 2024-01-15 NOTE — REASON FOR VISIT
[Family Member] : family member [Other: ______] : provided by YUDELKA [FreeTextEntry2] : 6/10/22 Follow up Left Hand \par   [Interpreters_FullName] : Daughter  [Interpreters_Relationshiptopatient] : Daughter  [FreeTextEntry3] : [place]

## 2024-01-15 NOTE — REVIEW OF SYSTEMS
[Negative] : Heme/Lymph [Arthralgia] : arthralgia [Joint Pain] : joint pain [Joint Stiffness] : joint stiffness [Joint Swelling] : joint swelling [Headache] : headache

## 2024-03-04 ENCOUNTER — APPOINTMENT (OUTPATIENT)
Dept: ORTHOPEDIC SURGERY | Facility: CLINIC | Age: 49
End: 2024-03-04
Payer: OTHER MISCELLANEOUS

## 2024-03-04 VITALS — HEIGHT: 66 IN | BODY MASS INDEX: 32.62 KG/M2 | WEIGHT: 203 LBS

## 2024-03-04 DIAGNOSIS — S67.22XD CRUSHING INJURY OF LEFT HAND, SUBSEQUENT ENCOUNTER: ICD-10-CM

## 2024-03-04 DIAGNOSIS — M77.8 OTHER ENTHESOPATHIES, NOT ELSEWHERE CLASSIFIED: ICD-10-CM

## 2024-03-04 DIAGNOSIS — S61.412D LACERATION W/OUT FOREIGN BODY OF LEFT HAND, SUBSEQUENT ENCOUNTER: ICD-10-CM

## 2024-03-04 PROCEDURE — 99213 OFFICE O/P EST LOW 20 MIN: CPT

## 2024-03-04 NOTE — REASON FOR VISIT
[Family Member] : family member [Other: ______] : provided by YUDELKA [FreeTextEntry2] : 6/10/22 Follow up Left Hand \par   [Interpreters_FullName] : Daughter  [Interpreters_Relationshiptopatient] : Daughter  [FreeTextEntry3] : [place]uv[place]

## 2024-03-04 NOTE — PHYSICAL EXAM
[] : intact incisions with sutures in place [2nd] : 2nd [MCP Joint] : MCP joint [4th] : 4th [3rd] : 3rd [MP Joint] : MP joint [Finger Flexion] : finger flexion [Finger Extension] : finger extension [4___] : dorsiflexion 4[unfilled]/5

## 2024-04-30 ENCOUNTER — APPOINTMENT (OUTPATIENT)
Dept: CARDIOLOGY | Facility: CLINIC | Age: 49
End: 2024-04-30
Payer: COMMERCIAL

## 2024-04-30 ENCOUNTER — NON-APPOINTMENT (OUTPATIENT)
Age: 49
End: 2024-04-30

## 2024-04-30 VITALS
TEMPERATURE: 98.9 F | DIASTOLIC BLOOD PRESSURE: 89 MMHG | HEIGHT: 66 IN | WEIGHT: 205 LBS | BODY MASS INDEX: 32.95 KG/M2 | HEART RATE: 70 BPM | OXYGEN SATURATION: 98 % | SYSTOLIC BLOOD PRESSURE: 142 MMHG

## 2024-04-30 DIAGNOSIS — I47.10 SUPRAVENTRICULAR TACHYCARDIA, UNSPECIFIED: ICD-10-CM

## 2024-04-30 DIAGNOSIS — R00.2 PALPITATIONS: ICD-10-CM

## 2024-04-30 DIAGNOSIS — I83.90 ASYMPTOMATIC VARICOSE VEINS OF UNSPECIFIED LOWER EXTREMITY: ICD-10-CM

## 2024-04-30 DIAGNOSIS — I10 ESSENTIAL (PRIMARY) HYPERTENSION: ICD-10-CM

## 2024-04-30 PROCEDURE — 99214 OFFICE O/P EST MOD 30 MIN: CPT

## 2024-04-30 PROCEDURE — G2211 COMPLEX E/M VISIT ADD ON: CPT

## 2024-04-30 PROCEDURE — 93000 ELECTROCARDIOGRAM COMPLETE: CPT

## 2024-04-30 RX ORDER — METOPROLOL SUCCINATE 50 MG/1
50 TABLET, EXTENDED RELEASE ORAL
Qty: 90 | Refills: 2 | Status: ACTIVE | COMMUNITY
Start: 2023-01-27 | End: 1900-01-01

## 2024-04-30 RX ORDER — DILTIAZEM HYDROCHLORIDE 180 MG/1
180 CAPSULE, EXTENDED RELEASE ORAL DAILY
Qty: 90 | Refills: 2 | Status: ACTIVE | COMMUNITY
Start: 2023-01-27 | End: 1900-01-01

## 2024-05-06 PROBLEM — R00.2 PALPITATIONS: Status: ACTIVE | Noted: 2022-07-26

## 2024-05-06 PROBLEM — I83.90 VARICOSE VEINS: Status: ACTIVE | Noted: 2021-06-29

## 2024-05-06 PROBLEM — I47.10 PAROXYSMAL SVT (SUPRAVENTRICULAR TACHYCARDIA): Status: ACTIVE | Noted: 2022-08-11

## 2024-05-06 PROBLEM — I10 HYPERTENSION: Status: ACTIVE | Noted: 2022-10-17

## 2024-05-06 NOTE — DISCUSSION/SUMMARY
[FreeTextEntry1] : palpitations associated with dizziness HTN hx of SVT varicose veins - going to ablation  -cont metoprolol succinate to 50mg once daily -increase diltiazem 180mg once daily for additional antihypertensive effect and to aid with SVT -on omega-3 fish oil - can continue but would monitor for increase in palpitations -cont heart healthy diet and exercise  [EKG obtained to assist in diagnosis and management of assessed problem(s)] : EKG obtained to assist in diagnosis and management of assessed problem(s)

## 2024-05-06 NOTE — HISTORY OF PRESENT ILLNESS
[FreeTextEntry1] : PCP: Dr. Josie Dhillon  48F SVT, hx of varicose veins who presents for follow-up  has hx of dizziness and palpitations, cardiac event monitor with SVT and an episode of NSVT No chest pain, LE edema, or orthopnea since initiation of diltiazem and metoprolol she has no significant palpitations  one episode of palpitations since the last visit dizziness with standing is improved BP noted to be higher than usual  Fam hx: Father  age 67 MI Grandfather fatal MI age 80s  Pregnancy hx: two children, no  labor, GDM, or PEC menopause ~

## 2024-05-06 NOTE — REVIEW OF SYSTEMS
[Feeling Fatigued] : feeling fatigued [Palpitations] : palpitations [Dizziness] : dizziness [Negative] : Heme/Lymph [SOB] : no shortness of breath

## 2024-05-06 NOTE — CARDIOLOGY SUMMARY
[de-identified] : 4/30/24: SR, poor R wave progression, nonspecific t wave changes 1/27/23: SR, poor R wave progression, nonspecific t wave abn 7/26/22: SR, anterior infarct pattern [de-identified] : 7/26/22-7/29/22 - SVT, NSVT x 6 beats [de-identified] : 9/12/22: Jose protocol 7:11. No ischemia [de-identified] : 9/7/22: \par  EF 60%, no significant valve disease

## 2025-02-03 ENCOUNTER — NON-APPOINTMENT (OUTPATIENT)
Age: 50
End: 2025-02-03

## 2025-02-03 ENCOUNTER — APPOINTMENT (OUTPATIENT)
Dept: ORTHOPEDIC SURGERY | Facility: CLINIC | Age: 50
End: 2025-02-03
Payer: OTHER MISCELLANEOUS

## 2025-02-03 VITALS — WEIGHT: 205 LBS | HEIGHT: 66 IN | BODY MASS INDEX: 32.95 KG/M2

## 2025-02-03 DIAGNOSIS — S53.402A UNSPECIFIED SPRAIN OF LEFT ELBOW, INITIAL ENCOUNTER: ICD-10-CM

## 2025-02-03 DIAGNOSIS — S43.492A OTHER SPRAIN OF LEFT SHOULDER JOINT, INITIAL ENCOUNTER: ICD-10-CM

## 2025-02-03 DIAGNOSIS — S63.502A UNSPECIFIED SPRAIN OF LEFT WRIST, INITIAL ENCOUNTER: ICD-10-CM

## 2025-02-03 DIAGNOSIS — M54.12 RADICULOPATHY, CERVICAL REGION: ICD-10-CM

## 2025-02-03 PROCEDURE — 73080 X-RAY EXAM OF ELBOW: CPT | Mod: LT

## 2025-02-03 PROCEDURE — 99213 OFFICE O/P EST LOW 20 MIN: CPT

## 2025-02-03 PROCEDURE — 73110 X-RAY EXAM OF WRIST: CPT | Mod: LT

## 2025-02-03 PROCEDURE — 72040 X-RAY EXAM NECK SPINE 2-3 VW: CPT

## 2025-02-08 ENCOUNTER — APPOINTMENT (OUTPATIENT)
Dept: MRI IMAGING | Facility: CLINIC | Age: 50
End: 2025-02-08

## 2025-02-08 PROCEDURE — 73221 MRI JOINT UPR EXTREM W/O DYE: CPT | Mod: NC

## 2025-02-08 PROCEDURE — 73221 MRI JOINT UPR EXTREM W/O DYE: CPT | Mod: LT

## 2025-02-11 ENCOUNTER — APPOINTMENT (OUTPATIENT)
Dept: MRI IMAGING | Facility: CLINIC | Age: 50
End: 2025-02-11
Payer: OTHER MISCELLANEOUS

## 2025-02-11 PROCEDURE — 72141 MRI NECK SPINE W/O DYE: CPT

## 2025-02-11 PROCEDURE — 73221 MRI JOINT UPR EXTREM W/O DYE: CPT | Mod: LT

## 2025-02-21 ENCOUNTER — NON-APPOINTMENT (OUTPATIENT)
Age: 50
End: 2025-02-21

## 2025-02-21 ENCOUNTER — APPOINTMENT (OUTPATIENT)
Dept: ORTHOPEDIC SURGERY | Facility: CLINIC | Age: 50
End: 2025-02-21
Payer: OTHER MISCELLANEOUS

## 2025-02-21 VITALS — WEIGHT: 205 LBS | BODY MASS INDEX: 32.95 KG/M2 | HEIGHT: 66 IN

## 2025-02-21 DIAGNOSIS — S43.492A OTHER SPRAIN OF LEFT SHOULDER JOINT, INITIAL ENCOUNTER: ICD-10-CM

## 2025-02-21 DIAGNOSIS — S63.502A UNSPECIFIED SPRAIN OF LEFT WRIST, INITIAL ENCOUNTER: ICD-10-CM

## 2025-02-21 DIAGNOSIS — M77.8 OTHER ENTHESOPATHIES, NOT ELSEWHERE CLASSIFIED: ICD-10-CM

## 2025-02-21 DIAGNOSIS — S53.402A UNSPECIFIED SPRAIN OF LEFT ELBOW, INITIAL ENCOUNTER: ICD-10-CM

## 2025-02-21 DIAGNOSIS — M54.12 RADICULOPATHY, CERVICAL REGION: ICD-10-CM

## 2025-02-21 PROCEDURE — 99213 OFFICE O/P EST LOW 20 MIN: CPT

## 2025-03-21 ENCOUNTER — APPOINTMENT (OUTPATIENT)
Dept: ORTHOPEDIC SURGERY | Facility: CLINIC | Age: 50
End: 2025-03-21
Payer: OTHER MISCELLANEOUS

## 2025-03-21 ENCOUNTER — NON-APPOINTMENT (OUTPATIENT)
Age: 50
End: 2025-03-21

## 2025-03-21 DIAGNOSIS — S53.402A UNSPECIFIED SPRAIN OF LEFT ELBOW, INITIAL ENCOUNTER: ICD-10-CM

## 2025-03-21 DIAGNOSIS — S63.502A UNSPECIFIED SPRAIN OF LEFT WRIST, INITIAL ENCOUNTER: ICD-10-CM

## 2025-03-21 DIAGNOSIS — M77.8 OTHER ENTHESOPATHIES, NOT ELSEWHERE CLASSIFIED: ICD-10-CM

## 2025-03-21 DIAGNOSIS — M54.12 RADICULOPATHY, CERVICAL REGION: ICD-10-CM

## 2025-03-21 DIAGNOSIS — S67.22XD CRUSHING INJURY OF LEFT HAND, SUBSEQUENT ENCOUNTER: ICD-10-CM

## 2025-03-21 DIAGNOSIS — S43.492A OTHER SPRAIN OF LEFT SHOULDER JOINT, INITIAL ENCOUNTER: ICD-10-CM

## 2025-03-21 PROCEDURE — 99213 OFFICE O/P EST LOW 20 MIN: CPT

## 2025-03-31 ENCOUNTER — NON-APPOINTMENT (OUTPATIENT)
Age: 50
End: 2025-03-31

## 2025-03-31 ENCOUNTER — APPOINTMENT (OUTPATIENT)
Dept: CARDIOLOGY | Facility: CLINIC | Age: 50
End: 2025-03-31
Payer: COMMERCIAL

## 2025-03-31 VITALS
OXYGEN SATURATION: 98 % | RESPIRATION RATE: 16 BRPM | HEART RATE: 66 BPM | SYSTOLIC BLOOD PRESSURE: 126 MMHG | HEIGHT: 66 IN | BODY MASS INDEX: 31.98 KG/M2 | WEIGHT: 199 LBS | DIASTOLIC BLOOD PRESSURE: 80 MMHG | TEMPERATURE: 98 F

## 2025-03-31 DIAGNOSIS — I47.10 SUPRAVENTRICULAR TACHYCARDIA, UNSPECIFIED: ICD-10-CM

## 2025-03-31 DIAGNOSIS — R00.2 PALPITATIONS: ICD-10-CM

## 2025-03-31 DIAGNOSIS — I83.90 ASYMPTOMATIC VARICOSE VEINS OF UNSPECIFIED LOWER EXTREMITY: ICD-10-CM

## 2025-03-31 PROCEDURE — G2211 COMPLEX E/M VISIT ADD ON: CPT

## 2025-03-31 PROCEDURE — 99214 OFFICE O/P EST MOD 30 MIN: CPT

## 2025-03-31 PROCEDURE — 93000 ELECTROCARDIOGRAM COMPLETE: CPT

## 2025-04-21 ENCOUNTER — APPOINTMENT (OUTPATIENT)
Dept: ORTHOPEDIC SURGERY | Facility: CLINIC | Age: 50
End: 2025-04-21
Payer: OTHER MISCELLANEOUS

## 2025-04-21 VITALS — HEIGHT: 66 IN | WEIGHT: 199 LBS | BODY MASS INDEX: 31.98 KG/M2

## 2025-04-21 DIAGNOSIS — S43.492A OTHER SPRAIN OF LEFT SHOULDER JOINT, INITIAL ENCOUNTER: ICD-10-CM

## 2025-04-21 DIAGNOSIS — M54.12 RADICULOPATHY, CERVICAL REGION: ICD-10-CM

## 2025-04-21 DIAGNOSIS — S67.22XD CRUSHING INJURY OF LEFT HAND, SUBSEQUENT ENCOUNTER: ICD-10-CM

## 2025-04-21 DIAGNOSIS — S63.502A UNSPECIFIED SPRAIN OF LEFT WRIST, INITIAL ENCOUNTER: ICD-10-CM

## 2025-04-21 DIAGNOSIS — M77.8 OTHER ENTHESOPATHIES, NOT ELSEWHERE CLASSIFIED: ICD-10-CM

## 2025-04-21 DIAGNOSIS — S53.402A UNSPECIFIED SPRAIN OF LEFT ELBOW, INITIAL ENCOUNTER: ICD-10-CM

## 2025-04-21 PROCEDURE — 99213 OFFICE O/P EST LOW 20 MIN: CPT

## 2025-06-09 ENCOUNTER — NON-APPOINTMENT (OUTPATIENT)
Age: 50
End: 2025-06-09

## 2025-06-09 ENCOUNTER — APPOINTMENT (OUTPATIENT)
Dept: ORTHOPEDIC SURGERY | Facility: CLINIC | Age: 50
End: 2025-06-09
Payer: OTHER MISCELLANEOUS

## 2025-06-09 PROCEDURE — 99213 OFFICE O/P EST LOW 20 MIN: CPT

## 2025-07-21 ENCOUNTER — APPOINTMENT (OUTPATIENT)
Dept: ORTHOPEDIC SURGERY | Facility: CLINIC | Age: 50
End: 2025-07-21
Payer: OTHER MISCELLANEOUS

## 2025-07-21 ENCOUNTER — NON-APPOINTMENT (OUTPATIENT)
Age: 50
End: 2025-07-21

## 2025-07-21 VITALS — BODY MASS INDEX: 31.98 KG/M2 | WEIGHT: 199 LBS | HEIGHT: 66 IN

## 2025-07-21 DIAGNOSIS — S43.492A OTHER SPRAIN OF LEFT SHOULDER JOINT, INITIAL ENCOUNTER: ICD-10-CM

## 2025-07-21 DIAGNOSIS — S61.412D LACERATION W/OUT FOREIGN BODY OF LEFT HAND, SUBSEQUENT ENCOUNTER: ICD-10-CM

## 2025-07-21 DIAGNOSIS — S63.502A UNSPECIFIED SPRAIN OF LEFT WRIST, INITIAL ENCOUNTER: ICD-10-CM

## 2025-07-21 DIAGNOSIS — S67.22XD CRUSHING INJURY OF LEFT HAND, SUBSEQUENT ENCOUNTER: ICD-10-CM

## 2025-07-21 DIAGNOSIS — S53.402A UNSPECIFIED SPRAIN OF LEFT ELBOW, INITIAL ENCOUNTER: ICD-10-CM

## 2025-07-21 DIAGNOSIS — M77.8 OTHER ENTHESOPATHIES, NOT ELSEWHERE CLASSIFIED: ICD-10-CM

## 2025-07-21 DIAGNOSIS — M54.12 RADICULOPATHY, CERVICAL REGION: ICD-10-CM

## 2025-07-21 PROCEDURE — 99213 OFFICE O/P EST LOW 20 MIN: CPT

## 2025-08-11 ENCOUNTER — APPOINTMENT (OUTPATIENT)
Dept: ORTHOPEDIC SURGERY | Facility: CLINIC | Age: 50
End: 2025-08-11
Payer: OTHER MISCELLANEOUS

## 2025-08-11 PROCEDURE — 99075 MEDICAL TESTIMONY: CPT

## 2025-09-08 ENCOUNTER — NON-APPOINTMENT (OUTPATIENT)
Age: 50
End: 2025-09-08

## 2025-09-08 ENCOUNTER — APPOINTMENT (OUTPATIENT)
Dept: ORTHOPEDIC SURGERY | Facility: CLINIC | Age: 50
End: 2025-09-08
Payer: OTHER MISCELLANEOUS

## 2025-09-08 VITALS — WEIGHT: 199 LBS | BODY MASS INDEX: 31.98 KG/M2 | HEIGHT: 66 IN

## 2025-09-08 DIAGNOSIS — S43.492A OTHER SPRAIN OF LEFT SHOULDER JOINT, INITIAL ENCOUNTER: ICD-10-CM

## 2025-09-08 DIAGNOSIS — M77.8 OTHER ENTHESOPATHIES, NOT ELSEWHERE CLASSIFIED: ICD-10-CM

## 2025-09-08 DIAGNOSIS — S53.402A UNSPECIFIED SPRAIN OF LEFT ELBOW, INITIAL ENCOUNTER: ICD-10-CM

## 2025-09-08 DIAGNOSIS — M54.12 RADICULOPATHY, CERVICAL REGION: ICD-10-CM

## 2025-09-08 DIAGNOSIS — S61.412D LACERATION W/OUT FOREIGN BODY OF LEFT HAND, SUBSEQUENT ENCOUNTER: ICD-10-CM

## 2025-09-08 DIAGNOSIS — S67.22XD CRUSHING INJURY OF LEFT HAND, SUBSEQUENT ENCOUNTER: ICD-10-CM

## 2025-09-08 PROCEDURE — 99213 OFFICE O/P EST LOW 20 MIN: CPT
